# Patient Record
Sex: MALE | Race: WHITE
[De-identification: names, ages, dates, MRNs, and addresses within clinical notes are randomized per-mention and may not be internally consistent; named-entity substitution may affect disease eponyms.]

---

## 2020-05-26 ENCOUNTER — HOSPITAL ENCOUNTER (EMERGENCY)
Dept: HOSPITAL 76 - ED | Age: 60
Discharge: HOME | End: 2020-05-26
Payer: COMMERCIAL

## 2020-05-26 VITALS — DIASTOLIC BLOOD PRESSURE: 102 MMHG | SYSTOLIC BLOOD PRESSURE: 133 MMHG

## 2020-05-26 DIAGNOSIS — S61.313A: Primary | ICD-10-CM

## 2020-05-26 DIAGNOSIS — F17.200: ICD-10-CM
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
## 2020-05-26 PROCEDURE — 1040M: CPT

## 2020-05-26 PROCEDURE — 99282 EMERGENCY DEPT VISIT SF MDM: CPT

## 2020-05-26 PROCEDURE — 12001 RPR S/N/AX/GEN/TRNK 2.5CM/<: CPT

## 2020-05-26 NOTE — ED PHYSICIAN DOCUMENTATION
PD HPI UPPER EXT INJURY





- Stated complaint


Stated Complaint: LT FINGER LAC





- Chief complaint


Chief Complaint: Laceration





- History obtained from


History obtained from: Patient





- History of Present Illness


Location: Left (Right-handed 60-year-old gentleman up-to-date on tetanus cut 

hisLeft middle finger on a table saw at work just prior to arrival.  He has no 

pain.)





Review of Systems


Constitutional: reports: Reviewed and negative


Cardiac: reports: Reviewed and negative


Respiratory: reports: Reviewed and negative





PD PAST MEDICAL HISTORY





- Past Medical History


Past Medical History: Yes


Cardiovascular: None


Respiratory: None


Neuro: None


Endocrine/Autoimmune: None


GI: None


: Kidney stones


HEENT: None


Psych: None


Musculoskeletal: None


Derm: None





- Past Surgical History


Past Surgical History: Yes


Derm: Skin cancer surgery





- Allergies


Allergies/Adverse Reactions: 


                                    Allergies











Allergy/AdvReac Type Severity Reaction Status Date / Time


 


Penicillins Allergy  Rash Verified 05/26/20 14:40














- Social History


Does the pt smoke?: Yes


Smoking Status: Current every day smoker


Does the pt drink ETOH?: No


Does the pt have substance abuse?: No





- Immunizations


Immunizations are current?: Yes





- POLST


Patient has POLST: No





PD ED PE NORMAL





- Vitals


Vital signs reviewed: Yes





- General


General: Alert and oriented X 3, No acute distress





- Extremities


Extremities: Other (There is a 1 cm laceration on the L 3rd finger tuft at the 

tip, just a bit into the nailbed dorsally. He is numb at the tip)





- Neuro


Neuro: Alert and oriented X 3, Normal speech





Results





- Vitals


Vitals: 


                               Vital Signs - 24 hr











  05/26/20 05/26/20





  14:40 16:05


 


Temperature 36.7 C 36.2 C L


 


Heart Rate 77 66


 


Respiratory 16 16





Rate  


 


Blood Pressure 151/73 H 153/87 H


 


O2 Saturation 97 97








                                     Oxygen











O2 Source                      Room air

















Procedures





- Laceration (location)


  ** L 3rd finger


Length in cm: 1


Wound type: Linear, Superficial


Neurovascular status: Motor intact, Vascular intact


Anesthesia: Lidocaine 1%, With bicarb (dig block)


Wound Preparation: Irrigated copiously NS


Skin layer closure: Nylon, Interrupted, Size #-0 - enter number [phone]), Sutures -

enter # ([phone])


Other: Tetanus UTD


Complexity: Simple





Departure





- Departure


Disposition: 01 Home, Self Care


Clinical Impression: 


Finger laceration


Qualifiers:


 Encounter type: initial encounter Finger: middle finger Damage to nail status: 

with damage Foreign body presence: without foreign body Laterality: left 

Qualified Code(s): S61.313A - Laceration without foreign body of left middle 

finger with damage to nail, initial encounter





Condition: Good


Record reviewed to determine appropriate education?: Yes


Instructions:  ED Laceration Hand


Comments: 


Come back for any signs of infection which would include: Redness, swelling, 

drainage, increased pain, or fevers.


You can wash it soap and water. Keep it covered and moist with bacitracin 

ointment which is available over the counter; avoid neosporin.


Follow-up with your physician in 10-14 days for suture removal.


Forms:  Activity restrictions